# Patient Record
Sex: MALE
[De-identification: names, ages, dates, MRNs, and addresses within clinical notes are randomized per-mention and may not be internally consistent; named-entity substitution may affect disease eponyms.]

---

## 2020-09-25 ENCOUNTER — HOSPITAL ENCOUNTER (INPATIENT)
Dept: HOSPITAL 46 - ED | Age: 55
LOS: 4 days | Discharge: HOME | DRG: 177 | End: 2020-09-29
Attending: INTERNAL MEDICINE | Admitting: INTERNAL MEDICINE
Payer: COMMERCIAL

## 2020-09-25 VITALS — WEIGHT: 199.5 LBS | HEIGHT: 68 IN | BODY MASS INDEX: 30.23 KG/M2

## 2020-09-25 DIAGNOSIS — Z86.11: ICD-10-CM

## 2020-09-25 DIAGNOSIS — R73.03: ICD-10-CM

## 2020-09-25 DIAGNOSIS — J96.01: ICD-10-CM

## 2020-09-25 DIAGNOSIS — J12.89: ICD-10-CM

## 2020-09-25 DIAGNOSIS — U07.1: Primary | ICD-10-CM

## 2020-09-25 PROCEDURE — A9270 NON-COVERED ITEM OR SERVICE: HCPCS

## 2020-09-25 PROCEDURE — C9803 HOPD COVID-19 SPEC COLLECT: HCPCS

## 2020-09-25 NOTE — NUR
PT ALERT AND ORIENTED X4 ALL SHIFT. CORRECTIONAL RESTRAINTS ASSESSED EVERY 2
HOURS FOR PT SAFETY AND COMFORT, [facility] OFFICERS X2 AT BEDSIDE ALL SHIFT. PT IS
IN FLAT  CUFF STYLE RESTRAINTS BILAT ARMS, LEGS, AND BELLY. CALL LIGHT IS
WITHIN PT REACH, PT EDUCATED ON HOW TO USE. PT ABLE TO REPOSITON SELF EASILY
IN BED, STANDS AT BEDSIDE TO VOID AND USES URINAL.

## 2020-09-25 NOTE — NUR
SHIFT REPORT RECEIVED. PATIENT RESTING IN BED WATCHING TV. VS STABLE. CALL
LIGHT IN REACH. CO X2 AT DOOR.

## 2020-09-25 NOTE — NUR
PT IS ABLE TO TRANSFER SELF FROM GURNEY TO BED WITH STANDBY ASSIST FOR CORD
MANAGEMENT. PT DENIES PAIN, NAUSEA, AND SOB AT THIS TIME. IV SITE IS INTACT,
NO REDNESS OR SWELLING NOTED, FLUSHES EASILY, PT DENIES PAIN AT SITE.

## 2020-09-25 NOTE — NUR
pt given dinner, and correctional insulin. pt instructed on proning in bed
from right to left side and belly. pt verbalizes understanding of this
therapy, and is agreable to this. reminders written on the white board.
encouraged pt to use incentive spirometer and acapella. pt reports pain in rt
shoulder is "better".

## 2020-09-25 NOTE — NUR
CALLED  TO UPDATE ON PT STATUS FOR O2 REQUIREMENT. AFTER EATING LUNCH,
PT O2 SAT INCREASES TO MID 90'S WHILE ON 5L O2 FLOW VIA NC. PT IS ABLE TO
STONEY O2 FLOW OF 3L AND MAINTAIN ABOVE 90%.

## 2020-09-25 NOTE — NUR
EDUCATION PROVIDED TO PT ABOUT COVID, REMDESIVIR, TYPE 2 DM, PLAN OF CARE. ALL
PT QUESTIONS ANSWERED. PT PARTICIPATES WELL.

## 2020-09-25 NOTE — NUR
PATIENT APPEARED TO BE SLEEPING WELL. NC OFF OF PATIENT, PATIENT AT 90% ON
ROOM AIR. PATIENT WOKE EASILY TO VOICE. ASSISTED PATIENT TO REPSOITION.
PROVIDED A CLOTH TO COVER HIS EYES. URNAL EMPTIED. IV STABLE. IV FLUIDS PER
ORDER. SITE WNL. NC PLACED BACK ONTO PATIENT AND TURNED TO 2L. PATIENT DENIED
ANY FURTHER NEEDS. CALL LIGHT IN REACH.

## 2020-09-25 NOTE — NUR
PATIENT PROVIDED WITH EVENING MEDS. ACCU CHECK, SSI PER ORDER. DISCUSSED
REASONS FOR GLUCOSE CONTROL AND EFFECT OF DECADRON TREATMENT ON BLOOD GLUCOSE.
PATIENT HAS MODERATE UNDERSTANIDNG. IV FLUIDS PER ORDER, SITE WNL. REENFORCED
WITH COBAN. VS SATBLE, ORAL TEMP WNL. PATIENT VOIDED TWICE FOR A TOTAL 
MLS SINCE DAYSHIFT WAS IN ROOM. AND ATE 75% OF HIS DINNER. PATIENT DENIED GI
ISSUES AT THIS TIME. NOT SOB AT REST BUT STATES "IT FEELS LIKE I CAN'T GET A
GOOD BREATH IN". ENCOURAGED CPT AND IS USE. PATIENT ON 4L NC WITH
HUMIDIFICATION. TOLERATED ACTIVITY UP TO THE BATHROOM TO BRUSH HIS TEETH AND
WASH HIS FACE. PATIENT OFFERED A GOWN, WHICH HE DECLINED. WEARING THE [facility]
OUTFIT. PATIENT RETURNED TO BED. FRESH ICE WATER PROVIDED. CALL LIGHT IN
REACH. PATIENT DENIED OTHER NEEDS.

## 2020-09-26 NOTE — NUR
PATIENT WOKE EASILY TO VOICE. MORNING LABS DRAWN. PATIENT TOLERATED WELL. IV
SITE IN RIGHT HAND APPEARS WNL. PATIENT DENIES ANY NEEDS OR CONCERNS THIS
MORNING. NC WAS DISPLACED FROM NOSE. PATIENT 90% ON ROOM AIR. NC REPLACED AND
TITRATED TO 1L.

## 2020-09-26 NOTE — NUR
PT RESTING IN HIS BED AND STATES HE HAS SOME SLIGHT SOB. UNABLE TO ASULATE
LUNGS DUE TO PAPR. RR 19 AND HIS SAT IS 94% ON 1L AT THIS TIME. PT ENCOUARAGE
TO CONTINUE TO PRONE AND USE HIS IS WHICH HE STATES HE DOES SOME. PT STATES HE
HAS AN OCC NON PRODUCTIVE COUGH. PT STATES HE FEELS A BIT WEAK IN HIS ARMS AND
LEGS WHICH IS UNCHANGED. HE STATES HE HAS HAD A POOR APPITITE BUT THAT IT NOW
SEEMS TO HAVE RETURNED. PT DENIES ANY PAIN OR OTHER PROBLEMS AT THIS TIME. SEE
ASSESSMENT.

## 2020-09-26 NOTE — NUR
Pt on 1lpm and had been stable , patient spo2 on monitor was 93% and rn and
doctor rounding today has been stable. Observation from window only done by
RT.

## 2020-09-26 NOTE — NUR
Pt continues resting in his bed and he denies any problems at this time. Pt
repositioned to his right side at this time after reinforcing the reasoning
for proning.

## 2020-09-26 NOTE — NUR
PATIENT RESTING IN BED. EVENING MEDS AND ACCU CHECK DONE. PATIENT REPORTS
FEELING MORE ENERGY TODAY BUT CONTINUES TO HAVE SOME CHEST DISCOMFORT WITH
ACTIVITY. PATIENT UP TO THE BATHROOM FOR EVENING CARES. ON ROOM AIR, O2 SAT
88% WHEN RETURNED TO BED. QUICKLY RECOVERED WITH 1L NC. VS STABLE. ORAL TEMP
WNL.

## 2020-09-26 NOTE — NUR
Pt resting in his bed eating his dinner and watching a football game. He
denies any new problems and states his breathing feels about the same but
states that it is a better than it was yesterday. Sat on 1l is 93% and RR is
20.

## 2020-09-26 NOTE — NUR
PATIENT UP TO THE BATHROOM WITH CO ASSIST. PATIENT STAYED ON 1L NC DURING
ACTIVITY. AND WAS 93% UPON RETURNING TO BED. PATIENT HAD A BM AND VOIDED.
PATIENT BACK IN BED. DENIES ANY NEEDS. CALL LIGHT IN REACH.

## 2020-09-26 NOTE — NUR
PATIENT UP TO THE BATHROOM INDEPENDENTLY. STEADY ON HIS FEET. PATIENT REPORTS
HAVING A LOOSE STOOL. PATIENT RETURNED TO BED. O2 SAT 90%, PUT NC BACK INTO
PLACE WITH 1L.

## 2020-09-26 NOTE — NUR
Pt ambulated to the br and gave himself a sponge bath and a shave and changed
his clothes. He states his breathing felt unchanged and his sat on returning
to his bed while on 1l is 94%. While he was cleaning up his bedding was
changed as well. Pt continues to deny any new problems.

## 2020-09-26 NOTE — NUR
PT CONTINUES TO DENY ANY PAIN OR NEW PROBLEMS. HE STATES HE CONTINUES TO HAVE
SOME SLIGHT SOB BUT STATES THAT IT IS IMPROVING. PT JUST USED HIS IS AND IS
NOW HITTING 1000, PER RT IT  YESTERDAY. SAT ON 1L VIA NC IS 94% AND HIS
RR IS 22 AT THIS TIME. SEE ASSESSMENT.

## 2020-09-27 NOTE — NUR
PT ALERT AND ORIENTED X4 ALL SHIFT. [facility] RESTRAINTS ASSESSED X4, KERLIX
PLACED UNDERNEATH WRIST RESTRAINTS FOR PT COMFORT. 2 [facility] OFFICERS MONITORING
AT DOOR ALL SHIFT. PT ABLE TO MOVE AND AMBULATE WITH RESTRAINTS. IV IN RIGHT
HAND FLUSHED X2, SITE WNL. PT DENIES PAIN, N/V. VSS. LUNG SOUNDS CLEAR. BG'S
SLIGHTLY ELEVATED, REQUIRING SLIDING SCALE HUMALOG X2. LANTUS DOSE ADJUSTED TO
12 UNITS BY DR. ARITA. PT EATING A DRINKING WELL THROUGHOUT DAY. SPENT 5 HOURS
UP IN CHAIR WATCHING TELEVISION. PT IN BED RESTING NOW.

## 2020-09-27 NOTE — NUR
DISCUSSED PATIENT'S CONDITION WITH MD. CHANGED STATUS TO MED-SURG AND KEEPING
PATIENT IN CCU FOR HOUSE CONVIENCE.

## 2020-09-27 NOTE — NUR
PATIENT CONTINUES TO SLEEP SOUNDLY WITH O2 SATS 90-95% ON 1L NC. ALLOWED
PATIENT TO REST. CALL LIGHT IN REACH.

## 2020-09-27 NOTE — NUR
PT HAS BEEN EATING AND DRINKING WELL THROUGHOUT THE DAY. AFTERNOON BG'S HAVE
BEEN ELEVATED; 0800-125/ 1200-245/ 1700-208. PT ASKED IF HE WAS GOING TO NEED
INSULIN LONG-TERM, THIS RN EXPLAINED HIS A1C LAB RESULT SHOULD BE BACK
MONDAY AND TREATMENT IS TO BE DETERMINED. PT WAS CONTENT WITH RESPONSE AND HAD
NO OTHER QUESTIONS OR CONCERNS. CALL LIGHT WITHIN REACH.

## 2020-09-27 NOTE — NUR
DR. ARITA IN THIS MORNING TO ASSESS PT. BG STABLE, 125 THIS MORNING,
18 UNITS LANUTS GIVEN, HUMALOG HELD. PT EATING WELL. LANTUS DOSE DECREASED NOW
TO 12 UNITS DAILY. PT BG TO BE MONITORED CLOSELY. PT DENIES SOB, BREATHING
APPEARS UNLABORED, ABLE TO TOLERATE AMBULATING TO THE BATHROOM WELL. PT
REMAINS ON ROOM AIR AND SATING IN THE MID 90'S. INDEPENDENT IN ROOM.
PT DENIES N/V, REPORTS HAVING 2 LOOSE STOOLS OVERNIGHT, REFUSED
METAMUCIL THIS MORNING. LIDOCAINE PATCH PLACED FOR CHRONIC RT SHOULDER PAIN.
NO ACUTE CHANGES.

## 2020-09-27 NOTE — NUR
PATIENT APPEARED TO BE SLEEPING SOUNDLY. WOKE EASILY TO VOICE. MORNING LABS
DRAWN. VS STABLE. PATIENT REPORTS PAIN ON HIS LEFT WRIST WHERE CUFF IS
RUBBING. SKIN IS INTACT. GAUZE SLEEVE PLACED UNDER CUFF TO PROTECT SKIN ON
BOTH WRIST. ANLKE CUFSS ARE OVER SOCKS, SKIN WNL. PATIENT HAS REMOVED HIS NC
AND TOLERATING ROOM AIR WITH O2 SAT 92-93%. DENIES FEELING SOB. NO OTHER NEEDS
AT THIS TIME. CALL LIGHT IN REACH.

## 2020-09-27 NOTE — NUR
Pt still up in chair, has a had a good appetite, ate 100% of lunch. Watching
television. Emptied 150 mls of clear, yellow urine from urinal. Notified pt of
hypoglycemia s/s and to notify nurse if having any symptoms. Pt verbalized
understanding. No complaints at this time. Call light within reach.

## 2020-09-27 NOTE — NUR
PT AWAKE IN BED. REQUESTED SNACK, GIVEN SUGAR-FREE JELLO AND PUDDING. LUNG
SOUNDS CLEAR. VSS. NO ACUTE CHANGES. CALL LIGHT WITHIN REACH.

## 2020-09-27 NOTE — NUR
EVENING ACCU CHECK DONE. PATIENT REFUSED METAMUCIL. VS STABLE. TOLERATING ROOM
AIR. REPORTS MINIMAL COUGH WITH NO SPUTUM PRODUCTION. IV SL, WNL. SKIN WNL
UNDER RESTRAINTS. PATIENT ATE ABOUT 75% OF HIS DINNER. DENIES OTHER NEEDS AT
THIS TIME. CALL LIGHT IN REACH.

## 2020-09-28 NOTE — NUR
ASSESSMENT COMPLETE, VS AND I/O'S COMPLETE. CBG CHECK AND INSULIN
ADMINISTERED. VSS, A+Ox4. PATIENT DENIES PAIN OR DISCOMFORT. PATIENT
EDUCATED REGARDING PREDIABETIC DIET, EXERCISE GOALS AND S/SX OF
HYPO/HYPERGLYCEMIA. PATIENT VERBALIZES LEARNING. PRINTED EDUCATION SHEETS
PROVIDED. IV WNL. FRESH TOWELS, WATER AND CLEAN GOWN PROVIDED. NO FURTHER
NEEDS AT THIS TIME.

## 2020-09-28 NOTE — NUR
PT UP IN CHAIR ALL AFTERNOON. HAS HAD A GOOD DAY. ALERT AND ORIENTED X4.
CORRECTIONAL RESTRAINTS IN PLACE BILATERAL WRISTS AND ANKLES, SKIN INTACT.
PT HAS ATE AND DRANK WELL TODAY, WITH SUFFICIENT OUTPUT. URINE CLEAR, YELLOW.
LUNG SOUNDS ARE CLEAR. VSS. 22G IV IN LEFT HAND REMAINS PATENT,
SALINE FLUSHED 2X. PT AMBULATED TO BATHROOM WITHOUT DIFFICULTY, INDEPENDENT IN
ROOM. PT DENIES PAIN AND NAUSEA AND IS W/O COMPLAINT AT THIS TIME. UP WATCHING
TELEVISION, CALL LIGHT WITHIN REACH.

## 2020-09-28 NOTE — NUR
REPORT RECIEVED FROM MICHELLE NICHOLSON. LFT: AST AND ALT ARE ELEVATED THIS MORNING. WILL
NOTIFY DR. PACHECO AND SEE ABOUT REMDESIVIR DOSE/TIME. PT ASLEEP IN BED,
VISUALIZED CHEST RISE AND FALL. CALL LIGHT WITHIN REACH.

## 2020-09-28 NOTE — NUR
MD AT NURSES STATION. VERBAL ORDER FOR HEPATIC PANEL LABS FOR MORNING REPEATED
BACK AND PLACED BY RN.

## 2020-09-28 NOTE — NUR
PT VSS. URINAL EMPTIED. PT UP TO CHAIR. NO ACUTE CHANGES. PT DENIES FURTHER
NEED. CALL LIGHT WITHIN REACH.

## 2020-09-28 NOTE — NUR
IV SITE IN RIGHT HAND IS DIFFICULT TO FLUSH AND APPEARS RED. NEW SITE
EASTABLISHED AND LABS DRAWN IN LEFT HAND. PATIENT TOLERATED WELL. VS STABLE.
PATIENT DENIES ANY NEEDS OR CONCERNS.

## 2020-09-28 NOTE — NUR
DR. PACHECO NOTIFIED OF ELEVATED AST/ALT. ORDERED TO CONTINUE PT ON REMDESIVIR.
PT UP IN BED AND EATING BREAKFAST.  THIS MORNING, SS HUMALOG HELD, 12
UNITS OF LANTUS GIVEN. GOWN CHANGED AND FACE WASHED. 300 MLS OF CLEAR, YELLOW
URINE EMPTIED FROM URINAL. PT DENIES PAIN, N/V. LIDOCAINE PATCH TO RT SHOULDER
FOR CHRONIC PAIN BUT PT DENIES ANY AT THIS TIME. VSS. PT REPORTS TO HAVE
SLEPT WELL LAST NIGHT. NO QUESTIONS OR CONCERNS AT THIS TIME. CALL LIGHT
WITHIN REACH.

## 2020-09-28 NOTE — NUR
REPORT RECEIVED FROM EDVIN NICHOLSON. PT SITTING UP IN CHAIR, ON ROOM AIR. 2 CO'S
SITTING OUTSIDE DOOR. NO APPARENT NEEDS AT THIS TIME.

## 2020-09-28 NOTE — NUR
Pt up in bed. Offered snack. BG checked, 209. Will give 2 units humalog.
Awaiting arrival of lunch tray. 22 g IV in left hand now saline locked. Pt
maintaining O2 sats in 90's on room air. Call light within reach.

## 2020-09-28 NOTE — NUR
ROUNDED ON PT. LAYING IN BED WITH LIGHTS OFF. BREATHING EVEN AND UNLABORED.
SPO2 AT 95%. NO APPARENT NEEDS AT THIS TIME.

## 2020-09-29 NOTE — NUR
Called and spoke with Claudia at [facility].  Updated pt will be discharged
today.  She confirms pt will return to [facility] with correctional [facility]. Chart
faxed.
 
Nurse to Nurse call number given to CCU nurses.

## 2020-09-29 NOTE — NUR
REPORT CALLED TO ISAK AT [facility] AT 1116. PT CHANGED INTO CLOTHES
FROM [facility] AND CHANGES INTO HARD 4 POINT RESTRAINTS. IV
REMOVED. VSS. DC INSTRUCTIONS REVIEWED. PT PACKET GIVEN TO [facility] GUARDS. PT HAS
NO QUESTIONS OR CONCERNS AT DISCHARGE. THIS NURSE WHEELED PT OUT TO [facility]
FACILITY VAN WITH GUARDS PRESENT.

## 2020-09-29 NOTE — NUR
PATIENT DOING WELL THIS SHIFT. VSS, A+OX4, PT DENIES PAIN OR DISCOMFORT THIS
SHIFT. SCHEDULED MEDS GIVEN. PATIENT SLEPT MAJORITY OF NIGHT. EDUCATION
REGARDING PREDIABETES PROVIDED. IV WNL. 2 CO'S OUTSIDE DOOR. PT DENIES ANY
NEEDS.

## 2020-09-29 NOTE — NUR
REPORT RECIEVED FROM RUSSELL NICHOLSON. PT UP TO BATHROOM THIS MORNING. NO ACUTE
CHANGES. PT IS WITHOUT COMPLAINT AT THIS TIME.

## 2020-09-29 NOTE — NUR
ROUNDED ON PATIENT. LABS DRAWN, VS AND I/O'S COMPLETE, ASSESSMENT COMPLETE.
PATIENT DENIES SOB, PAIN, DISCOMFORT. SPO2% WNL. ON ROOM AIR. VSS. NO COUGH
NOTED. PATIENT LAYING IN BED WITH LIGHTS OFF. DENIES ANY NEEDS AT THIS TIME.

## 2023-10-27 NOTE — NUR
PT USING HIS IS AND ACUPELLA AS REQUESTED. PT HIT 1000 ON THE IS. PT IS
PRONING ON AND OFF TODAY. SAT IS NOW 96% ON 1L VIA NC AND HIS RR IS 21. PT
STATES HIS BREATING IS FEELING ABOUT THE SAME. SEE ASSESSMENT. Patient admitted, consent signed and questions answered. Patient ready for procedure. Call light to reach with side rails up 2 of 2. No family at bedside with patient. History and physical complete.